# Patient Record
Sex: MALE | Employment: OTHER | URBAN - METROPOLITAN AREA
[De-identification: names, ages, dates, MRNs, and addresses within clinical notes are randomized per-mention and may not be internally consistent; named-entity substitution may affect disease eponyms.]

---

## 2017-04-13 RX ORDER — HYDROCORTISONE 20 MG/1
TABLET ORAL
Qty: 30 TAB | Refills: 5 | Status: SHIPPED | OUTPATIENT
Start: 2017-04-13 | End: 2017-05-31

## 2017-04-13 NOTE — TELEPHONE ENCOUNTER
Patient sister in reference to the following medication   Requested Prescriptions     Pending Prescriptions Disp Refills    hydrocortisone (CORTEF) 20 mg tablet [Pharmacy Med Name: HYDROCORT 20MG      TAB] 30 Tab 0     Sig: TAKE ONE TABLET BY MOUTH ONCE DAILY FOR  ANEMIA

## 2017-06-01 ENCOUNTER — PATIENT OUTREACH (OUTPATIENT)
Dept: FAMILY MEDICINE CLINIC | Age: 42
End: 2017-06-01

## 2017-06-01 NOTE — PROGRESS NOTES
Nurse Navigator  POST ED NOTE  Patient discharged on 05/31/17 from [facility] ED : seizures          Attempted to reach patient's sister Dale Black. Unable to reach. Left message on voicemail with contact info. No future PCP appt currently scheduled at this time    Will call again          This note will not be viewable in [address]

## 2017-06-02 ENCOUNTER — PATIENT OUTREACH (OUTPATIENT)
Dept: FAMILY MEDICINE CLINIC | Age: 42
End: 2017-06-02

## 2017-06-02 NOTE — PROGRESS NOTES
Nurse Navigator  POST ED NOTE  Patient discharged on 05/31/17 from [facility] ED : seizures              Attempted to reach patient's sister again Kenia Thompson. Unable to reach. Left message on voicemail with contact info.     No future PCP appt currently scheduled at this time     Letter sent            This note will not be viewable in [facility].

## 2017-08-29 ENCOUNTER — PATIENT OUTREACH (OUTPATIENT)
Dept: FAMILY MEDICINE CLINIC | Age: 42
End: 2017-08-29

## 2017-08-29 NOTE — PROGRESS NOTES
Received patient from [facility] daily report. Noted patient admitted 08/18/17 and discharged 08/18/17 to [address] [facility]. Facility researched and noted it is located in [facility]. Attempted to reach patient's sister. Phone number currently not accepting calls at this time. Patient last seen in office 11/16/2016. No future appt currently scheduled with Dr. Espinoza Morales at this time.         Letter sent

## 2017-08-29 NOTE — LETTER
8/29/2017 11:55 AM 
 
Mr. Samantha Narvaez [id number] 
[facility] [address] Dear Mr. Kayla Wilson, 
 
 
I am a Nurse Navigator working with your primary care provider (PCP), Dr. Ras Mark. Part of my job is to follow up with patients who have been in the hospital to see how they are feeling, answer any questions they may have about their visit, and also make sure they have a follow-up appointment to see their primary care doctor. I have been unable to reach you by telephone and wanted to make sure that you scheduled a follow-up appointment to come in and talk to Dr. Mora Andrade about your recent visit to the hospital.   
 
Please call our office at [phone] to schedule your appointment as soon as possible. If you have another provider managing your care at this time, please call the office so that we can update your chart. If you have any questions or concerns, please call [phone]. Thank you for allowing us to participate in your care! Sincerely, Gabrielle HAYESN, RN   Nurse Navigator [facility] [address] [address] Office   [phone] Fax   [phone]

## 2017-10-02 ENCOUNTER — PATIENT OUTREACH (OUTPATIENT)
Dept: FAMILY MEDICINE CLINIC | Age: 42
End: 2017-10-02

## 2017-10-02 NOTE — PROGRESS NOTES
This note is by Nurse Navigator that is supporting the office at this time. Patient listed on discharge daily report on 10/2/17  NN attempt to contact Patient on 10/2/17. From Daily report, Patient admitted on 9/29/17 and discharged on 09/29/17 to [address] [place] [address]. Attempt to contact patient and/o caregiver via telephone on 10/2/17 for post ED follow up. Unable to reach. Phone numbers are invalid. Patient last seen in Dr. Oliver Mcadams office on 11/16/2016. Noted , No future appt currently scheduled with Dr. Vita Carlson at this time.

## 2017-10-06 ENCOUNTER — DOCUMENTATION ONLY (OUTPATIENT)
Dept: FAMILY MEDICINE CLINIC | Age: 42
End: 2017-10-06

## 2017-10-06 NOTE — LETTER
10/6/2017 Tony Narvaez [id number] 
[facility] [address] Dear Mr. Tony Prasad, We had an appointment reserved for you 9/25/2017 and were concerned when you did not show or call within 24 hours to cancel the appointment. Our policy is to call patients two days prior to their appointment to remind them of the date and time. We perform these calls as a courtesy to our patients and to allow us the opportunity to rebook the time slot should the appointment not be necessary. Recognizing that everyones time is valuable and that appointment time is limited, we ask that you provide 24 hours notice if you are unable to keep your appointment. Please call us at your earliest convenience to reschedule your appointment as your provider felt it was important to see you. Thank you for your anticipated cooperation. The scheduling staff: 
 
[address] [place] [address],[address] [place] [id number] [phone]

## 2017-10-25 RX ORDER — PHENYTOIN 50 MG/1
TABLET, CHEWABLE ORAL
Qty: 6 TAB | Refills: 89 | OUTPATIENT
Start: 2017-10-25

## 2017-12-02 DIAGNOSIS — E03.8 OTHER SPECIFIED HYPOTHYROIDISM: ICD-10-CM

## 2017-12-11 RX ORDER — LEVOTHYROXINE SODIUM 50 UG/1
TABLET ORAL
Qty: 30 TAB | Refills: 11 | Status: SHIPPED | OUTPATIENT
Start: 2017-12-11 | End: 2017-12-19 | Stop reason: SDUPTHER

## 2017-12-19 ENCOUNTER — OFFICE VISIT (OUTPATIENT)
Dept: FAMILY MEDICINE CLINIC | Age: 42
End: 2017-12-19

## 2017-12-19 VITALS
DIASTOLIC BLOOD PRESSURE: 76 MMHG | WEIGHT: 97 LBS | RESPIRATION RATE: 16 BRPM | SYSTOLIC BLOOD PRESSURE: 92 MMHG | OXYGEN SATURATION: 76 % | BODY MASS INDEX: 13.14 KG/M2 | HEIGHT: 72 IN | TEMPERATURE: 97.2 F | HEART RATE: 49 BPM

## 2017-12-19 DIAGNOSIS — D64.9 ANEMIA, UNSPECIFIED TYPE: ICD-10-CM

## 2017-12-19 DIAGNOSIS — E03.8 OTHER SPECIFIED HYPOTHYROIDISM: ICD-10-CM

## 2017-12-19 DIAGNOSIS — Z00.00 MEDICARE ANNUAL WELLNESS VISIT, SUBSEQUENT: ICD-10-CM

## 2017-12-19 DIAGNOSIS — R56.9 SEIZURE (HCC): ICD-10-CM

## 2017-12-19 DIAGNOSIS — R64 CACHEXIA (HCC): ICD-10-CM

## 2017-12-19 DIAGNOSIS — Z13.220 SCREENING CHOLESTEROL LEVEL: ICD-10-CM

## 2017-12-19 DIAGNOSIS — Z78.9 FULL CODE STATUS: Primary | ICD-10-CM

## 2017-12-19 RX ORDER — PHENYTOIN 50 MG/1
TABLET, CHEWABLE ORAL
Qty: 90 TAB | Refills: 5 | Status: SHIPPED | OUTPATIENT
Start: 2017-12-19 | End: 2017-12-19 | Stop reason: SDUPTHER

## 2017-12-19 RX ORDER — LEVOTHYROXINE SODIUM 50 UG/1
TABLET ORAL
Qty: 90 TAB | Refills: 3 | Status: CANCELLED | OUTPATIENT
Start: 2017-12-19

## 2017-12-19 RX ORDER — LEVOTHYROXINE SODIUM 50 UG/1
TABLET ORAL
Qty: 90 TAB | Refills: 3 | Status: SHIPPED | OUTPATIENT
Start: 2017-12-19 | End: 2017-12-19 | Stop reason: SDUPTHER

## 2017-12-19 RX ORDER — HYDROCORTISONE 20 MG/1
20 TABLET ORAL DAILY
Qty: 30 TAB | Refills: 4 | Status: CANCELLED | OUTPATIENT
Start: 2017-12-19

## 2017-12-19 RX ORDER — HYDROCORTISONE 20 MG/1
20 TABLET ORAL DAILY
Qty: 30 TAB | Refills: 4 | Status: SHIPPED | OUTPATIENT
Start: 2017-12-19 | End: 2018-12-31 | Stop reason: SDUPTHER

## 2017-12-19 RX ORDER — PHENYTOIN 50 MG/1
TABLET, CHEWABLE ORAL
Qty: 90 TAB | Refills: 5 | Status: CANCELLED | OUTPATIENT
Start: 2017-12-19

## 2017-12-19 RX ORDER — LEVOTHYROXINE SODIUM 50 UG/1
TABLET ORAL
Qty: 90 TAB | Refills: 3 | Status: SHIPPED | OUTPATIENT
Start: 2017-12-19 | End: 2018-12-31 | Stop reason: SDUPTHER

## 2017-12-19 RX ORDER — PHENYTOIN 50 MG/1
TABLET, CHEWABLE ORAL
Qty: 90 TAB | Refills: 5 | Status: SHIPPED | OUTPATIENT
Start: 2017-12-19 | End: 2018-06-12 | Stop reason: SDUPTHER

## 2017-12-19 RX ORDER — HYDROCORTISONE 20 MG/1
20 TABLET ORAL DAILY
Qty: 30 TAB | Refills: 4 | Status: SHIPPED | OUTPATIENT
Start: 2017-12-19 | End: 2017-12-19 | Stop reason: SDUPTHER

## 2017-12-19 NOTE — PROGRESS NOTES
Subjective:     HPI:  Rosi Waterman is a 36 y.o. male who presents to the office with his sister for 1 year follow-up. Patient is non-verbal.  Sister is responsible for ADL's patient is capable of feeding himself. Home Health: Pt's main caregiver is his sister. Sister states she is unable to work and care for the patient. She is currently not working. She recently moved to [place], [place]. She has applied for home health 3 times in the past but has not been successful. Pt's sister is currently studying to get a GED and certification in Phlebotomy. Pt's sister requests home health referral to get assistance in her new place. She has recently moved to [place] she plans continue with me as Saurabh's PCP     Seizure: Pt takes Dilantin regularly for seizures. Pt has been to ED multiple times to get his medication refilled. Pt has not had any recent episodes of seizures. Of note,  Sister reports that patient's appetite has been good. He likes eating peanut butter and jelly, bananas, pork, meat loaf, and saleh. He doesn't like eating fruits and vegetables. He likes to drink milk, water, and certain juices. Bowel movement and urination are normal.     Wt Readings from Last 3 Encounters:   12/19/17 97 lb (44 kg)   06/28/17 97 lb (44 kg)   05/31/17 105 lb (47.6 kg)         Current Outpatient Prescriptions   Medication Sig Dispense Refill    levothyroxine (SYNTHROID) 50 mcg tablet TAKE ONE TABLET BY MOUTH ONCE DAILY BEFORE  BREAKFAST  FOR  HYPOTHYROIDISM 90 Tab 3    phenytoin (DILANTIN) 50 mg chewable tablet CHEW AND SWALLOW 1TAB BY MOUTH in the morning and 2 tab in the evening. 90 Tab 5    hydrocortisone (CORTEF) 20 mg tablet Take 1 Tab by mouth daily. Indications: anemia? 30 Tab 4    HYDROCORT/PRAMOXN/SKN CLNSR#16 (HYDROCORT-PRAMOX-SKIN CLNSR#16 RE) Insert 20 mg into rectum.           Allergies   Allergen Reactions    Aspirin Other (comments)    Kidney Romo Unknown (comments)    Sulfa (Sulfonamide Antibiotics) Other (comments)       Past Medical History:   Diagnosis Date    Autism     Blind     Diabetes ([address]     Other ill-defined conditions(799.89)     [facility]     Thyroid disorder         Past Surgical History:   Procedure Laterality Date    HX HEENT  as a baby. removal of right eye due to retinoblastoma. sees shadows with left eye       Family History   Problem Relation Age of Onset    Hypertension Mother     Heart Disease Mother     COPD Mother        Social History     Social History    Marital status: SINGLE     Spouse name: N/A    Number of children: N/A    Years of education: N/A     Occupational History    Not on file. Social History Main Topics    Smoking status: Never Smoker    Smokeless tobacco: Never Used    Alcohol use No    Drug use: No    Sexual activity: No     Other Topics Concern    Not on file     Social History Narrative       REVIEW OF SYSTEM:  Review of Systems   Constitutional: Negative for chills and fever. Respiratory: Negative for shortness of breath. Cardiovascular: Negative for chest pain, palpitations and leg swelling. Gastrointestinal: Negative for constipation, diarrhea, nausea and vomiting. Musculoskeletal: Negative for joint pain. Neurological: Negative for headaches. Objective:     Visit Vitals    BP 92/76 (BP 1 Location: Right arm, BP Patient Position: Sitting)    Pulse (!) 49    Temp 97.2 °F (36.2 °C) (Oral)    Resp 16    Ht 6' (1.829 m)    Wt 97 lb (44 kg)    SpO2 (!) 76%    BMI 13.16 kg/m2       PHYSICAL EXAM:  Physical Exam   Constitutional: He is oriented to person, place, and time and well-developed, well-nourished, and in no distress. HENT:   Right Ear: Tympanic membrane, external ear and ear canal normal.   Left Ear: Tympanic membrane, external ear and ear canal normal.   Nose: Nose normal.   Mouth/Throat: Oropharynx is clear and moist.   Eyes: Pupils are equal, round, and reactive to light.        Crusting noted to eyelashes of both eyes. Neck: Normal range of motion. Neck supple. No thyromegaly present. Cardiovascular: Normal rate, regular rhythm, normal heart sounds and intact distal pulses. No murmur heard. Pulmonary/Chest: Effort normal and breath sounds normal. He has no wheezes. Neurological: He is alert and oriented to person, place, and time. Skin: Skin is warm and dry. Vitals reviewed. Assessment/Plan:       ICD-10-CM ICD-9-CM    1. Full code status Z78.9 V49.89 FULL CODE   2. Other specified hypothyroidism E03.8 244.8 TSH 3RD GENERATION      levothyroxine (SYNTHROID) 50 mcg tablet      DISCONTINUED: levothyroxine (SYNTHROID) 50 mcg tablet   3. [facility]xia ([facility] R64 799.4 CBC WITH AUTOMATED DIFF      METABOLIC PANEL, COMPREHENSIVE      REFERRAL TO HOME HEALTH   4. Anemia, unspecified type D64.9 285.9 hydrocortisone (CORTEF) 20 mg tablet      DISCONTINUED: hydrocortisone (CORTEF) 20 mg tablet   5. Seizure ([facility] R56.9 780.39 phenytoin (DILANTIN) 50 mg chewable tablet      DISCONTINUED: phenytoin (DILANTIN) 50 mg chewable tablet   6. Screening cholesterol level Z13.220 V77.91 LIPID PANEL   7. Medicare annual wellness visit, subsequent Z00.00 V70.0      Patient given opportunity to ask questions. Questions answered. Home health referral completed today. Labs ordered in office today. Prescriptions re-sent to local pharmacy as patient will be in the area for a little while. Patient's sister understands plan of care. Follow-up Disposition: Not on File      Written by Addie Amaya, as dictated by Emma Domniguez DO.    I, Dr. Emma Dominguez, confirm that all documentation is accurate.

## 2017-12-19 NOTE — PROGRESS NOTES
1. Have you been to the ER, urgent care clinic since your last visit? Hospitalized since your last visit? NO    2. Have you seen or consulted any other health care providers outside of the [address] [place] [address] since your last visit? Include any pap smears or colon screening.  NO

## 2017-12-19 NOTE — PROGRESS NOTES
This is a Subsequent Medicare Annual Wellness Exam (AWV) (Performed 12 months after IPPE or effective date of Medicare Part B enrollment)    I have reviewed the patient's medical history in detail and updated the computerized patient record. History     Past Medical History:   Diagnosis Date    Autism     Blind     Diabetes ([facility]     Other ill-defined conditions(799.89)     Seizures ([facility]     Thyroid disorder       Past Surgical History:   Procedure Laterality Date    HX HEENT  as a baby. removal of right eye due to retinoblastoma. sees shadows with left eye     Current Outpatient Prescriptions   Medication Sig Dispense Refill    levothyroxine (SYNTHROID) 50 mcg tablet TAKE ONE TABLET BY MOUTH ONCE DAILY BEFORE  BREAKFAST  FOR  HYPOTHYROIDISM 30 Tab 11    phenytoin (DILANTIN) 50 mg chewable tablet CHEW AND SWALLOW 1TAB BY MOUTH in the morning and 2 tab in the evening. 90 Tab 5    HYDROCORT/PRAMOXN/SKN CLNSR#16 (HYDROCORT-PRAMOX-SKIN CLNSR#16 RE) Insert 20 mg into rectum. Allergies   Allergen Reactions    Aspirin Other (comments)    Kidney Romo Unknown (comments)    Sulfa (Sulfonamide Antibiotics) Other (comments)     Family History   Problem Relation Age of Onset    Hypertension Mother     Heart Disease Mother     COPD Mother      Social History   Substance Use Topics    Smoking status: Never Smoker    Smokeless tobacco: Never Used    Alcohol use No     Patient Active Problem List   Diagnosis Code    Autism F84.0    Cachexia ([facility] R64    Seizure ([facility] R56.9    Hypoglycemia E16.2    Hypotension I95.9    Blindness of both eyes H54.3    Hypothyroidism E03.9       Depression Risk Factor Screening:     PHQ over the last two weeks 12/19/2017   Little interest or pleasure in doing things Not at all   Feeling down, depressed or hopeless Not at all   Total Score PHQ 2 0     Alcohol Risk Factor Screening: You do not drink alcohol or very rarely.       Functional Ability and Level of Safety:   Hearing Loss  Unable to test     Vision - Patient is blind in both eyes. Activities of Daily Living  The home contains: no safety equipment. Patient needs help with:  transportation, preparing meals, housework, continence, grooming, dressing, bathing, hygiene, bathroom needs and walking. Fall RiskNo flowsheet data found. Abuse Screen  Patient is not abused    Cognitive Screening   Evaluation of Cognitive Function:  Has your family/caregiver stated any concerns about your memory: no  Abnormal    Patient Care Team   Patient Care Team:  Marcella Solorio DO as PCP - General (Family Practice)  Hallie Lopez as Ambulatory Care Navigator    Assessment/Plan   Education and counseling provided:  End of Life Counseling (with patient's permission) - Advanced directives and code status discussed with patient. Pt given advanced directive paperwork to review and complete at home. Diagnoses and all orders for this visit:    1. Full code status  -     FULL CODE    2. Other specified hypothyroidism  -     TSH 3RD GENERATION; Future  -     levothyroxine (SYNTHROID) 50 mcg tablet; TAKE ONE TABLET BY MOUTH ONCE DAILY BEFORE  BREAKFAST  FOR  HYPOTHYROIDISM    3. Cachexia ([facility]  -     CBC WITH AUTOMATED DIFF; Future  -     METABOLIC PANEL, COMPREHENSIVE; Future  -     REFERRAL TO HOME HEALTH    4. Anemia, unspecified type  -     hydrocortisone (CORTEF) 20 mg tablet; Take 1 Tab by mouth daily. Indications: anemia? 5. Seizure ([address]  -     phenytoin (DILANTIN) 50 mg chewable tablet; CHEW AND SWALLOW 1TAB BY MOUTH in the morning and 2 tab in the evening. 6. Screening cholesterol level  -     LIPID PANEL; Future    7. Medicare annual wellness visit, subsequent      Health Maintenance Due   Topic Date Due    DTaP/Tdap/Td series (1 - Tdap) 07/09/1995    Influenza Age 5 to Adult  08/01/2017     Patient given opportunity to ask questions. Questions answered. Patient understands plan of care.    Follow-up Disposition:  Return in about 6 months (around 6/19/2018).

## 2017-12-19 NOTE — PATIENT INSTRUCTIONS
Medicare Part B Preventive Services Limitations Recommendation Scheduled   Bone Mass Measurement  (age 72 & older, biennial) Requires diagnosis related to osteoporosis or estrogen deficiency. Biennial benefit unless patient has history of long-term glucocorticoid tx or baseline is needed because initial test was by other method     Cardiovascular Screening Blood Tests (every 5 years)  Total cholesterol, HDL, Triglycerides Order as a panel if possible     Colorectal Cancer Screening  -Fecal occult blood test (annual)  -Flexible sigmoidoscopy (5y)  -Screening colonoscopy (10y)  -Barium Enema      Counseling to Prevent Tobacco Use (up to 8 sessions per year)  - Counseling greater than 3 and up to 10 minutes  - Counseling greater than 10 minutes Patients must be asymptomatic of tobacco-related conditions to receive as preventive service     Diabetes Screening Tests (at least every 3 years, Medicare covers annually or at 6-month intervals for prediabetic patients)    Fasting blood sugar (FBS) or glucose tolerance test (GTT) Patient must be diagnosed with one of the following:  -Hypertension, Dyslipidemia, obesity, previous impaired FBS or GTT  Or any two of the following: overweight, FH of diabetes, age ? 72, history of gestational diabetes, birth of baby weighing more than 9 pounds     Diabetes Self-Management Training (DSMT) (no USPSTF recommendation) Requires referral by treating physician for patient with diabetes or renal disease. 10 hours of initial DSMT session of no less than 30 minutes each in a continuous 12-month period. 2 hours of follow-up DSMT in subsequent years.      Glaucoma Screening (no USPSTF recommendation) Diabetes mellitus, family history, , age 48 or over,  American, age 72 or over     Human Immunodeficiency Virus (HIV) Screening (annually for increased risk patients)  HIV-1 and HIV-2 by EIA, RANDAL, rapid antibody test, or oral mucosa transudate Patient must be at increased risk for HIV infection per USPSTF guidelines or pregnant. Tests covered annually for patients at increased risk. Pregnant patients may receive up to 3 test during pregnancy. Medical Nutrition Therapy (MNT) (for diabetes or renal disease not recommended schedule) Requires referral by treating physician for patient with diabetes or renal disease. Can be provided in same year as diabetes self-management training (DSMT), and CMS recommends medical nutrition therapy take place after DSMT. Up to 3 hours for initial year and 2 hours in subsequent years. Prostate Cancer Screening (annually up to age 76)  - Digital rectal exam (GET)  - Prostate specific antigen (PSA) Annually (age 48 or over), GET not paid separately when covered E/M service is provided on same date  Men up to age 76 may need a screening blood test for prostate cancer at certain intervals, depending on their personal and family history. This decision is between the patient and his provider. Seasonal Influenza Vaccination (annually)        Pneumococcal Vaccination (once after 72)      Hepatitis B Vaccinations (if medium/high risk) Medium/high risk factors:  End-stage renal disease,  Hemophiliacs who received Factor VIII or IX concentrates, Clients of institutions for the mentally retarded, Persons who live in the same house as a HepB virus carrier, Homosexual men, Illicit injectable drug abusers. Shingles Vaccination A shingles vaccine is also recommended once in a lifetime after age 61     Ultrasound Screening for Abdominal Aortic Aneurysm (AAA) (once) Patient must be referred through [facility] and not have had a screening for abdominal aortic aneurysm before under Medicare.   Limited to patients who meet one of the following criteria:  - Men who are 73-68 years old and have smoked more than 100 cigarettes in their lifetime.  -Anyone with a FH of AAA  -Anyone recommended for screening by USPSTF

## 2017-12-19 NOTE — MR AVS SNAPSHOT
Visit Information Date & Time Provider Department Dept. Phone Encounter #  
 12/19/2017  2:20 PM Shell King, [address] [facility] [id number] Upcoming Health Maintenance Date Due DTaP/Tdap/Td series (1 - Tdap) 7/9/1995 Influenza Age 5 to Adult 8/1/2017 Allergies as of 12/19/2017  Review Complete On: 12/19/2017 By: Elza Cesar LPN Severity Noted Reaction Type Reactions Aspirin  10/16/2015    Other (comments) Kidney Romo  06/28/2017    Unknown (comments) Sulfa (Sulfonamide Antibiotics)  10/16/2015    Other (comments) Current Immunizations  Never Reviewed Name Date Influenza Vaccine 1/30/2015 Pneumococcal Vaccine (Unspecified Type) 1/1/2012 Not reviewed this visit You Were Diagnosed With   
  
 Codes Comments Full code status    -  Primary ICD-10-CM: Z78.9 ICD-9-CM: V49.89 Other specified hypothyroidism     ICD-10-CM: E03.8 ICD-9-CM: 244.8 [facility]     ICD-10-CM: R64 
ICD-9-CM: 799.4 Anemia, unspecified type     ICD-10-CM: D64.9 ICD-9-CM: 285.9 Seizure ([facility]     ICD-10-CM: R56.9 ICD-9-CM: 780.39 Screening cholesterol level     ICD-10-CM: J79.311 ICD-9-CM: V77.91 Medicare annual wellness visit, subsequent     ICD-10-CM: Z00.00 ICD-9-CM: V70.0 Vitals BP Pulse Temp Resp Height(growth percentile) Weight(growth percentile) 92/76 (BP 1 Location: Right arm, BP Patient Position: Sitting) (!) 49 97.2 °F (36.2 °C) (Oral) 16 6' (1.829 m) 97 lb (44 kg) SpO2 BMI Smoking Status (!) 76% 13.16 kg/m2 Never Smoker Vitals History BMI and BSA Data Body Mass Index Body Surface Area  
 13.16 kg/m 2 1.5 m 2 Preferred Pharmacy Pharmacy Name Phone [facility] [address] [place] [address] [phone] Your Updated Medication List  
  
   
This list is accurate as of: 12/19/17  4:39 PM.  Always use your most recent med list.  
  
  
  
  
 HYDROCORT-PRAMOX-SKIN CLNSR#16 RE Insert 20 mg into rectum. hydrocortisone 20 mg tablet Commonly known as:  CORTEF Take 1 Tab by mouth daily. Indications: anemia?  
  
 levothyroxine 50 mcg tablet Commonly known as:  SYNTHROID  
TAKE ONE TABLET BY MOUTH ONCE DAILY BEFORE  BREAKFAST  FOR  HYPOTHYROIDISM phenytoin 50 mg chewable tablet Commonly known as:  DILANTIN  
CHEW AND SWALLOW 1TAB BY MOUTH in the morning and 2 tab in the evening. Prescriptions Sent to Pharmacy Refills  
 levothyroxine (SYNTHROID) 50 mcg tablet 3 Sig: TAKE ONE TABLET BY MOUTH ONCE DAILY BEFORE  BREAKFAST  FOR  HYPOTHYROIDISM Class: Normal  
 Pharmacy: [address] [facility] Ph #: [phone] phenytoin (DILANTIN) 50 mg chewable tablet 5 Sig: CHEW AND SWALLOW 1TAB BY MOUTH in the morning and 2 tab in the evening. Class: Normal  
 Pharmacy: [address] Ph #: [phone]  
 hydrocortisone (CORTEF) 20 mg tablet 4 Sig: Take 1 Tab by mouth daily. Indications: anemia? Class: Normal  
 Pharmacy: [address] [facility] Ph #: [phone] Route: Oral  
  
We Performed the Following FULL CODE [COD2 Custom] [address] Comments:  
 Please evaluate patient for home health needs. May need medical social worker if services needed. [place], [place]. To-Do List   
 12/19/2017 Lab:  CBC WITH AUTOMATED DIFF   
  
 12/19/2017 Lab:  LIPID PANEL   
  
 12/19/2017 Lab:  METABOLIC PANEL, COMPREHENSIVE   
  
 12/19/2017 Lab:  TSH 3RD GENERATION Referral Information Referral ID Referred By Referred To  
  
 [id number] Jose Rafael Renee [address] [address] [address] [facility] Phone: [phone] Visits Status Start Date End Date 1 New Request 12/19/17 12/19/18 If your referral has a status of pending review or denied, additional information will be sent to support the outcome of this decision. Patient Instructions Medicare Part B Preventive Services Limitations Recommendation Scheduled Bone Mass Measurement 
(age 72 & older, biennial) Requires diagnosis related to osteoporosis or estrogen deficiency. Biennial benefit unless patient has history of long-term glucocorticoid tx or baseline is needed because initial test was by other method Cardiovascular Screening Blood Tests (every 5 years) Total cholesterol, HDL, Triglycerides Order as a panel if possible Colorectal Cancer Screening 
-Fecal occult blood test (annual) -Flexible sigmoidoscopy (5y) 
-Screening colonoscopy (10y) -Barium Enema Counseling to Prevent Tobacco Use (up to 8 sessions per year) - Counseling greater than 3 and up to 10 minutes - Counseling greater than 10 minutes Patients must be asymptomatic of tobacco-related conditions to receive as preventive service Diabetes Screening Tests (at least every 3 years, Medicare covers annually or at 6-month intervals for prediabetic patients) Fasting blood sugar (FBS) or glucose tolerance test (GTT) Patient must be diagnosed with one of the following: 
-Hypertension, Dyslipidemia, obesity, previous impaired FBS or GTT 
Or any two of the following: overweight, FH of diabetes, age ? 72, history of gestational diabetes, birth of baby weighing more than 9 pounds Diabetes Self-Management Training (DSMT) (no USPSTF recommendation) Requires referral by treating physician for patient with diabetes or renal disease. 10 hours of initial DSMT session of no less than 30 minutes each in a continuous 12-month period. 2 hours of follow-up DSMT in subsequent years.     
Glaucoma Screening (no USPSTF recommendation) Diabetes mellitus, family history, , age 48 or over,  American, age 72 or over Human Immunodeficiency Virus (HIV) Screening (annually for increased risk patients) HIV-1 and HIV-2 by EIA, RANDAL, rapid antibody test, or oral mucosa transudate Patient must be at increased risk for HIV infection per USPSTF guidelines or pregnant. Tests covered annually for patients at increased risk. Pregnant patients may receive up to 3 test during pregnancy. Medical Nutrition Therapy (MNT) (for diabetes or renal disease not recommended schedule) Requires referral by treating physician for patient with diabetes or renal disease. Can be provided in same year as diabetes self-management training (DSMT), and CMS recommends medical nutrition therapy take place after DSMT. Up to 3 hours for initial year and 2 hours in subsequent years. Prostate Cancer Screening (annually up to age 76) - Digital rectal exam (GET) - Prostate specific antigen (PSA) Annually (age 48 or over), GET not paid separately when covered E/M service is provided on same date Men up to age 76 may need a screening blood test for prostate cancer at certain intervals, depending on their personal and family history. This decision is between the patient and his provider. Seasonal Influenza Vaccination (annually) Pneumococcal Vaccination (once after 65) Hepatitis B Vaccinations (if medium/high risk) Medium/high risk factors:  End-stage renal disease, Hemophiliacs who received Factor VIII or IX concentrates, Clients of institutions for the mentally retarded, Persons who live in the same house as a HepB virus carrier, Homosexual men, Illicit injectable drug abusers. Shingles Vaccination A shingles vaccine is also recommended once in a lifetime after age 61 Ultrasound Screening for Abdominal Aortic Aneurysm (AAA) (once) Patient must be referred through IPPE and not have had a screening for abdominal aortic aneurysm before under Medicare. Limited to patients who meet one of the following criteria: 
- Men who are 73-68 years old and have smoked more than 100 cigarettes in their lifetime. 
-Anyone with a FH of AAA 
-Anyone recommended for screening by [facility] Introducing [facility]! Lawrence Duenas introduces QSecure patient portal. Now you can access parts of your medical record, email your doctor's office, and request medication refills online. 1. In your internet browser, go to [email or web address] 2. Click on the First Time User? Click Here link in the Sign In box. You will see the New Member Sign Up page. 3. Enter your QSecure Access Code exactly as it appears below. You will not need to use this code after youve completed the sign-up process. If you do not sign up before the expiration date, you must request a new code. · QSecure Access Code: [email or web address] Expires: 3/19/2018  4:39 PM 
 
4. Enter the last four digits of your Social Security Number (xxxx) and Date of Birth (mm/dd/yyyy) as indicated and click Submit. You will be taken to the next sign-up page. 5. Create a QSecure ID. This will be your QSecure login ID and cannot be changed, so think of one that is secure and easy to remember. 6. Create a QSecure password. You can change your password at any time. 7. Enter your Password Reset Question and Answer. This can be used at a later time if you forget your password. 8. Enter your e-mail address. You will receive e-mail notification when new information is available in [address] 9. Click Sign Up. You can now view and download portions of your medical record. 10. Click the Download Summary menu link to download a portable copy of your medical information. If you have questions, please visit the Frequently Asked Questions section of the QSecure website. Remember, QSecure is NOT to be used for urgent needs. For medical emergencies, dial 911. Now available from your iPhone and Android! Please provide this summary of care documentation to your next provider. Your primary care clinician is listed as Teresa Wong. If you have any questions after today's visit, please call [phone].

## 2018-06-12 DIAGNOSIS — R56.9 SEIZURE (HCC): ICD-10-CM

## 2018-06-17 RX ORDER — PHENYTOIN 50 MG/1
TABLET, CHEWABLE ORAL
Qty: 90 TAB | Refills: 5 | Status: SHIPPED | OUTPATIENT
Start: 2018-06-17 | End: 2018-12-31 | Stop reason: SDUPTHER

## 2018-10-29 ENCOUNTER — TELEPHONE (OUTPATIENT)
Dept: FAMILY MEDICINE CLINIC | Age: 43
End: 2018-10-29
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
## 2018-11-28 ENCOUNTER — DOCUMENTATION ONLY (OUTPATIENT)
Dept: FAMILY MEDICINE CLINIC | Age: 43
End: 2018-11-28

## 2018-11-28 NOTE — LETTER
11/28/2018 Francisco Narvaez [address] 
[facility] [id number] Dear Mr. Francisco Jha, We had an appointment reserved for you on 11/27/18 and were concerned when you did not show or call within 24 hours to cancel the appointment. Our policy is to call patients two days prior to their appointment to remind them of the date and time. We perform these calls as a courtesy to our patients and to allow us the opportunity to rebook the time slot should the appointment not be necessary. Recognizing that everyones time is valuable and that appointment time is limited, we ask that you provide 24 hours notice if you are unable to keep your appointment. Please call us at your earliest convenience to reschedule your appointment as your provider felt it was important to see you. Thank you for your anticipated cooperation. [address] [place] [id number] 
[phone]

## 2018-12-26 DIAGNOSIS — E03.8 OTHER SPECIFIED HYPOTHYROIDISM: ICD-10-CM

## 2018-12-29 RX ORDER — LEVOTHYROXINE SODIUM 50 UG/1
TABLET ORAL
Qty: 90 TAB | Refills: 0 | Status: SHIPPED | OUTPATIENT
Start: 2018-12-29 | End: 2018-12-31 | Stop reason: SDUPTHER

## 2018-12-30 NOTE — TELEPHONE ENCOUNTER
No further refills will be given. Given 90 day supply. Needs office appointment within the next 90 days.

## 2018-12-31 ENCOUNTER — HOSPITAL ENCOUNTER (OUTPATIENT)
Dept: LAB | Age: 43
Discharge: HOME | End: 2018-12-31
Payer: MEDICARE

## 2018-12-31 ENCOUNTER — OFFICE VISIT (OUTPATIENT)
Dept: FAMILY MEDICINE CLINIC | Age: 43
End: 2018-12-31

## 2018-12-31 VITALS
WEIGHT: 98.6 LBS | TEMPERATURE: 97.6 F | HEART RATE: 88 BPM | HEIGHT: 72 IN | BODY MASS INDEX: 13.35 KG/M2 | SYSTOLIC BLOOD PRESSURE: 98 MMHG | RESPIRATION RATE: 16 BRPM | DIASTOLIC BLOOD PRESSURE: 68 MMHG

## 2018-12-31 DIAGNOSIS — E03.8 OTHER SPECIFIED HYPOTHYROIDISM: ICD-10-CM

## 2018-12-31 DIAGNOSIS — R64 CACHEXIA (HCC): ICD-10-CM

## 2018-12-31 DIAGNOSIS — Z00.00 MEDICARE ANNUAL WELLNESS VISIT, SUBSEQUENT: Primary | ICD-10-CM

## 2018-12-31 DIAGNOSIS — D64.9 ANEMIA, UNSPECIFIED TYPE: ICD-10-CM

## 2018-12-31 DIAGNOSIS — Z00.00 VISIT FOR WELL MAN HEALTH CHECK: ICD-10-CM

## 2018-12-31 DIAGNOSIS — R56.9 SEIZURE (HCC): ICD-10-CM

## 2018-12-31 LAB
ALBUMIN SERPL-MCNC: 3.4 G/DL (ref 3.4–5)
ALBUMIN/GLOB SERPL: 0.8 {RATIO} (ref 0.8–1.7)
ALP SERPL-CCNC: 129 U/L (ref 45–117)
ALT SERPL-CCNC: 19 U/L (ref 16–61)
ANION GAP SERPL CALC-SCNC: 8 MMOL/L (ref 3–18)
AST SERPL-CCNC: 25 U/L (ref 15–37)
BASOPHILS # BLD: 0 K/UL (ref 0–0.1)
BASOPHILS NFR BLD: 0 % (ref 0–2)
BILIRUB SERPL-MCNC: 0.2 MG/DL (ref 0.2–1)
BUN SERPL-MCNC: 12 MG/DL (ref 7–18)
BUN/CREAT SERPL: 16 (ref 12–20)
CALCIUM SERPL-MCNC: 8.1 MG/DL (ref 8.5–10.1)
CHLORIDE SERPL-SCNC: 105 MMOL/L (ref 100–108)
CO2 SERPL-SCNC: 25 MMOL/L (ref 21–32)
CREAT SERPL-MCNC: 0.77 MG/DL (ref 0.6–1.3)
DIFFERENTIAL METHOD BLD: ABNORMAL
EOSINOPHIL # BLD: 0.1 K/UL (ref 0–0.4)
EOSINOPHIL NFR BLD: 1 % (ref 0–5)
ERYTHROCYTE [DISTWIDTH] IN BLOOD BY AUTOMATED COUNT: 14.2 % (ref 11.6–14.5)
GLOBULIN SER CALC-MCNC: 4.2 G/DL (ref 2–4)
GLUCOSE SERPL-MCNC: 78 MG/DL (ref 74–99)
HCT VFR BLD AUTO: 34.1 % (ref 36–48)
HGB BLD-MCNC: 10.8 G/DL (ref 13–16)
LYMPHOCYTES # BLD: 0.9 K/UL (ref 0.9–3.6)
LYMPHOCYTES NFR BLD: 7 % (ref 21–52)
MCH RBC QN AUTO: 28.8 PG (ref 24–34)
MCHC RBC AUTO-ENTMCNC: 31.7 G/DL (ref 31–37)
MCV RBC AUTO: 90.9 FL (ref 74–97)
MONOCYTES # BLD: 0.9 K/UL (ref 0.05–1.2)
MONOCYTES NFR BLD: 8 % (ref 3–10)
NEUTS SEG # BLD: 9.6 K/UL (ref 1.8–8)
NEUTS SEG NFR BLD: 84 % (ref 40–73)
PHENYTOIN SERPL-MCNC: 28.7 UG/ML (ref 10–20)
PLATELET # BLD AUTO: 223 K/UL (ref 135–420)
PMV BLD AUTO: 12.7 FL (ref 9.2–11.8)
POTASSIUM SERPL-SCNC: 3.7 MMOL/L (ref 3.5–5.5)
PROT SERPL-MCNC: 7.6 G/DL (ref 6.4–8.2)
RBC # BLD AUTO: 3.75 M/UL (ref 4.7–5.5)
SODIUM SERPL-SCNC: 138 MMOL/L (ref 136–145)
TSH SERPL DL<=0.05 MIU/L-ACNC: 0.63 UIU/ML (ref 0.36–3.74)
WBC # BLD AUTO: 11.5 K/UL (ref 4.6–13.2)

## 2018-12-31 PROCEDURE — 84443 ASSAY THYROID STIM HORMONE: CPT

## 2018-12-31 PROCEDURE — 85025 COMPLETE CBC W/AUTO DIFF WBC: CPT

## 2018-12-31 PROCEDURE — 80185 ASSAY OF PHENYTOIN TOTAL: CPT

## 2018-12-31 PROCEDURE — 36415 COLL VENOUS BLD VENIPUNCTURE: CPT

## 2018-12-31 PROCEDURE — 80053 COMPREHEN METABOLIC PANEL: CPT

## 2018-12-31 RX ORDER — PHENYTOIN 50 MG/1
TABLET, CHEWABLE ORAL
Qty: 90 TAB | Refills: 11 | Status: SHIPPED | OUTPATIENT
Start: 2018-12-31 | End: 2020-01-03 | Stop reason: SDUPTHER

## 2018-12-31 RX ORDER — HYDROCORTISONE 20 MG/1
20 TABLET ORAL DAILY
Qty: 30 TAB | Refills: 11 | Status: SHIPPED | OUTPATIENT
Start: 2018-12-31 | End: 2020-01-08 | Stop reason: SDUPTHER

## 2018-12-31 RX ORDER — LEVOTHYROXINE SODIUM 50 UG/1
TABLET ORAL
Qty: 90 TAB | Refills: 3 | Status: SHIPPED | OUTPATIENT
Start: 2018-12-31 | End: 2020-01-08 | Stop reason: SDUPTHER

## 2018-12-31 NOTE — PROGRESS NOTES
Subjective:     HPI:  Adeline Bentley is a 39 y.o. male who presents to the office today for routine care. Patient is non-verbal.  Sister is responsible for ADL's patient is capable of feeding himself. Last office visit. 6/2017     Seizure: Pt takes Dilantin regularly for seizures. Pt's last seizure was about 7 months ago. Communication: Pt's sister notes that he is able to communicate when he wants food, drink, or to go to the bathroom. She states that he does not regularly have incidences of incontinence most episodes occur during the night, but even those are infrequent. Pt's sister states that pt's toes curl which makes it hard for his shoes to fit. He usually will not wear shoes. Of note, pt's sister reports that pt has a bowel movement once every 2 days. Straining sometimes but not always. Does not like to eat vegetables. But reports if she tries prune juice it gives him multiple episodes of encontinence. Pt's sister also notes that pt likes peanut butter and jelly sandwiches and [place] food. She states that he does not like to eat vegetables, but will eat bananas and applesauce. Current Outpatient Medications   Medication Sig Dispense Refill    hydrocortisone (CORTEF) 20 mg tablet Take 1 Tab by mouth daily. 30 Tab 11    levothyroxine (SYNTHROID) 50 mcg tablet TAKE ONE TABLET BY MOUTH ONCE DAILY BEFORE  BREAKFAST  FOR  HYPOTHYROIDISM 90 Tab 3    phenytoin (DILANTIN) 50 mg chewable tablet CHEW AND SWALLOW 1TAB BY MOUTH in the morning and 2 tab in the evening. 90 Tab 11        Allergies   Allergen Reactions    Aspirin Other (comments)    Kidney Romo Unknown (comments)    Sulfa (Sulfonamide Antibiotics) Other (comments)       Past Medical History:   Diagnosis Date    Autism     Blind     Diabetes ([address]     Other ill-defined conditions(789.89)     Seizures ([address]     Thyroid disorder         Past Surgical History:   Procedure Laterality Date    HX HEENT  as a baby. removal of right eye due to retinoblastoma. sees shadows with left eye       Family History   Problem Relation Age of Onset    Hypertension Mother     Heart Disease Mother     COPD Mother        Social History     Socioeconomic History    Marital status: SINGLE     Spouse name: Not on file    Number of children: Not on file    Years of education: Not on file    Highest education level: Not on file   Social Needs    Financial resource strain: Not on file    Food insecurity - worry: Not on file    Food insecurity - inability: Not on file   [employer] needs - medical: Not on file   [employer] needs - non-medical: Not on file   Occupational History    Not on file   Tobacco Use    Smoking status: Never Smoker    Smokeless tobacco: Never Used   Substance and Sexual Activity    Alcohol use: No    Drug use: No    Sexual activity: No   Other Topics Concern    Not on file   Social History Narrative    Not on file       REVIEW OF SYSTEM:  Review of Systems   Constitutional: Negative for chills and fever. Eyes: Negative for blurred vision. Respiratory: Negative for shortness of breath. Cardiovascular: Negative for chest pain, palpitations and leg swelling. Gastrointestinal: Negative for constipation, diarrhea, nausea and vomiting. Musculoskeletal: Negative for joint pain. Neurological: Negative for headaches. Objective:     Visit Vitals  BP 98/68 (BP 1 Location: Right arm, BP Patient Position: Sitting)   Pulse 88   Temp 97.6 °F (36.4 °C) (Oral)   Resp 16   Ht 6' (1.829 m)   Wt 98 lb 9.6 oz (44.7 kg)   BMI 13.37 kg/m²       PHYSICAL EXAM:  Physical Exam   Constitutional: Vital signs are normal. He appears cachectic. Cooperative during physical exam.   Sitting up in wheel chair. HENT:   Head: Normocephalic and atraumatic.    Right Ear: Tympanic membrane, external ear and ear canal normal.   Left Ear: Tympanic membrane, external ear and ear canal normal.   Nose: Mucosal edema (right side) present. Mouth/Throat: Oropharynx is clear and moist and mucous membranes are normal. Normal dentition. No dental caries. Eyes: Left eye exhibits normal extraocular motion. Left pupil is not reactive. Neck: Normal range of motion. Neck supple. No thyromegaly present. Cardiovascular: Normal rate, regular rhythm, normal heart sounds and intact distal pulses. No murmur heard. Pulmonary/Chest: Effort normal and breath sounds normal. He has no wheezes. Abdominal: Bowel sounds are normal. There is no tenderness. Musculoskeletal:        Right knee: He exhibits deformity (contracture). Left foot: There is deformity (toes contracted in flexed position. ). Feet:    Neurological: He is alert. Skin: Skin is warm and dry. No bruising and no lesion noted. No erythema. Vitals reviewed. Assessment/Plan:       ICD-10-CM ICD-9-CM    1. Visit for well man health check H21.04 Z47.9 METABOLIC PANEL, COMPREHENSIVE   2. Anemia, unspecified type D64.9 285.9 hydrocortisone (CORTEF) 20 mg tablet      METABOLIC PANEL, COMPREHENSIVE      CBC WITH AUTOMATED DIFF   3. Other specified hypothyroidism E03.8 244.8 levothyroxine (SYNTHROID) 50 mcg tablet      TSH 3RD GENERATION   4. Seizure ([facility]) R56.9 780.39 phenytoin (DILANTIN) 50 mg chewable tablet      PHENYTOIN     Patient given opportunity to ask questions. Questions answered. Patient understands plan of care. Sister declines flu shot today. Consider physical therapy in the future if patient will tolerate. Consider ortho for release of contractures. Follow-up Disposition:  Return in about 6 months (around 6/30/2019). Written by Eneida Sadler, as dictated by Alexa Ventura DO.    I, Dr. Alexa Ventura, confirm that all documentation is accurate.

## 2018-12-31 NOTE — PROGRESS NOTES
This is the Subsequent Medicare Annual Wellness Exam, performed 12 months or more after the Initial AWV or the last Subsequent AWV    I have reviewed the patient's medical history in detail and updated the computerized patient record. History     Past Medical History:   Diagnosis Date    Autism     Blind     Diabetes ([facility]     Other ill-defined conditions(799.89)     Seizures ([facility]     Thyroid disorder       Past Surgical History:   Procedure Laterality Date    HX HEENT  as a baby. removal of right eye due to retinoblastoma. sees shadows with left eye     Current Outpatient Medications   Medication Sig Dispense Refill    hydrocortisone (CORTEF) 20 mg tablet Take 1 Tab by mouth daily. 30 Tab 11    levothyroxine (SYNTHROID) 50 mcg tablet TAKE ONE TABLET BY MOUTH ONCE DAILY BEFORE  BREAKFAST  FOR  HYPOTHYROIDISM 90 Tab 3    phenytoin (DILANTIN) 50 mg chewable tablet CHEW AND SWALLOW 1TAB BY MOUTH in the morning and 2 tab in the evening. 90 Tab 11     Allergies   Allergen Reactions    Aspirin Other (comments)    Kidney Romo Unknown (comments)    Sulfa (Sulfonamide Antibiotics) Other (comments)     Family History   Problem Relation Age of Onset    Hypertension Mother     Heart Disease Mother     COPD Mother      Social History     Tobacco Use    Smoking status: Never Smoker    Smokeless tobacco: Never Used   Substance Use Topics    Alcohol use: No     Patient Active Problem List   Diagnosis Code    Autism F84.0    Cachexia ([facility] R64    Seizure ([facility] R56.9    Hypoglycemia E16.2    Hypotension I95.9    Blindness of both eyes H54.3    Hypothyroidism E03.9       Depression Risk Factor Screening:     PHQ over the last two weeks 12/19/2017   Little interest or pleasure in doing things Not at all   Feeling down, depressed, irritable, or hopeless Not at all   Total Score PHQ 2 0     Alcohol Risk Factor Screening: You do not drink alcohol or very rarely.     Functional Ability and Level of Safety:   Hearing Loss  The patient needs further evaluation. Activities of Daily Living  The home contains: no safety equipment. Patient needs help with:  transportation, preparing meals, managing medications, dressing, bathing, hygiene, bathroom needs and walking  Patient is independent with eating only. Fall Risk  No flowsheet data found. Abuse Screen  Patient is not abused  Pt is under total care of Sister and Nephew    Cognitive Screening   Evaluation of Cognitive Function:  Has your family/caregiver stated any concerns about your memory: not able to test.     Patient Care Team   Patient Care Team:  Mechelle Blanchard DO as PCP - General (Family Practice)    Assessment/Plan   Education and counseling provided:  Are appropriate based on today's review and evaluation    Diagnoses and all orders for this visit:    1. Visit for well man health check  -     METABOLIC PANEL, COMPREHENSIVE; Future    2. Anemia, unspecified type  -     hydrocortisone (CORTEF) 20 mg tablet; Take 1 Tab by mouth daily.  -     METABOLIC PANEL, COMPREHENSIVE; Future  -     CBC WITH AUTOMATED DIFF; Future    3. Other specified hypothyroidism  -     levothyroxine (SYNTHROID) 50 mcg tablet; TAKE ONE TABLET BY MOUTH ONCE DAILY BEFORE  BREAKFAST  FOR  HYPOTHYROIDISM  -     TSH 3RD GENERATION; Future    4.  Seizure ([address]  -     phenytoin (DILANTIN) 50 mg chewable tablet; CHEW AND SWALLOW 1TAB BY MOUTH in the morning and 2 tab in the evening.  -     PHENYTOIN; Future        Health Maintenance Due   Topic Date Due    DTaP/Tdap/Td series (1 - Tdap) 07/09/1995    MEDICARE YEARLY EXAM  12/20/2018

## 2018-12-31 NOTE — PROGRESS NOTES
1. Have you been to the ER, urgent care clinic since your last visit? Hospitalized since your last visit? No    2. Have you seen or consulted any other health care providers outside of the [address] [place] [address] since your last visit? Include any pap smears or colon screening. No     Patient presents in office today for routine care.   Patient concerns: med refills

## 2019-01-18 DIAGNOSIS — R56.9 SEIZURE (HCC): ICD-10-CM

## 2019-01-23 RX ORDER — PHENYTOIN 50 MG/1
TABLET, CHEWABLE ORAL
Qty: 90 TAB | Refills: 5 | Status: SHIPPED | OUTPATIENT
Start: 2019-01-23 | End: 2019-07-22 | Stop reason: ALTCHOICE

## 2019-07-22 ENCOUNTER — OFFICE VISIT (OUTPATIENT)
Dept: FAMILY MEDICINE CLINIC | Age: 44
End: 2019-07-22

## 2019-07-22 VITALS
OXYGEN SATURATION: 98 % | DIASTOLIC BLOOD PRESSURE: 80 MMHG | RESPIRATION RATE: 20 BRPM | WEIGHT: 90 LBS | TEMPERATURE: 98 F | BODY MASS INDEX: 12.19 KG/M2 | HEART RATE: 60 BPM | HEIGHT: 72 IN | SYSTOLIC BLOOD PRESSURE: 110 MMHG

## 2019-07-22 DIAGNOSIS — D64.9 ANEMIA, UNSPECIFIED TYPE: ICD-10-CM

## 2019-07-22 DIAGNOSIS — E03.8 OTHER SPECIFIED HYPOTHYROIDISM: ICD-10-CM

## 2019-07-22 DIAGNOSIS — Z74.09 MOBILITY IMPAIRED: ICD-10-CM

## 2019-07-22 DIAGNOSIS — R56.9 SEIZURE (HCC): ICD-10-CM

## 2019-07-22 DIAGNOSIS — G47.9 DISORDERED SLEEP: ICD-10-CM

## 2019-07-22 DIAGNOSIS — Z00.00 ANNUAL PHYSICAL EXAM: Primary | ICD-10-CM

## 2019-07-22 DIAGNOSIS — E83.51 HYPOCALCEMIA: ICD-10-CM

## 2019-07-22 DIAGNOSIS — R26.2 AMBULATORY DYSFUNCTION: ICD-10-CM

## 2019-07-22 RX ORDER — UREA 10 %
1 LOTION (ML) TOPICAL
Qty: 90 TAB | Refills: 1 | Status: SHIPPED | OUTPATIENT
Start: 2019-07-22 | End: 2020-01-08 | Stop reason: SDUPTHER

## 2019-07-22 NOTE — PATIENT INSTRUCTIONS
Phenytoin (By mouth)   Phenytoin (FEN-i-toin)  Treats and prevents seizures. Brand Name(s): Dilantin, Dilantin Infatabs, Dilantin Kapseals, Dilantin-125, Phenytek   There may be other brand names for this medicine. When This Medicine Should Not Be Used: This medicine is not right for everyone. Do not use it if you had an allergic reaction to phenytoin or similar medicines, or if you are pregnant. How to Use This Medicine:   Capsule, Long Acting Capsule, Liquid, Chewable Tablet  · Your doctor will tell you how much medicine to use. Do not use more than directed. · You may take this medicine with food if it upsets your stomach. Take this medicine at the same time each day. · Capsule: Swallow whole. Do not open, crush, or chew it. · Chewable tablet: May be chewed, swallowed whole, or crushed before you swallow it. · Oral liquid: Shake just before each use. Measure the oral liquid medicine with a marked measuring spoon, oral syringe, or medicine cup. · Feeding tube: This medicine should be given at least 2 hours before or 2 hours after a feeding. · This medicine should come with a Medication Guide. Ask your pharmacist for a copy if you do not have one. · Missed dose: Take a dose as soon as you remember. If it is almost time for your next dose, wait until then and take a regular dose. Do not take extra medicine to make up for a missed dose. · Store the medicine in a closed container at room temperature, away from heat, moisture, and direct light. Do not freeze the oral liquid. Drugs and Foods to Avoid:   Ask your doctor or pharmacist before using any other medicine, including over-the-counter medicines, vitamins, and herbal products. · Do not use this medicine together with delavirdine. · The list below includes some of the medicines that can interact with phenytoin. There are many other drugs not listed. Make sure your doctor knows the names of all the medicines you use.   ¨ Tell your doctor if you are using Sukumar's wort, albendazole, amiodarone, aspirin, chlordiazepoxide, cyclosporine, diazepam, diazoxide, digoxin, disulfiram, folic acid, furosemide, isoniazid, methylphenidate, nisoldipine, praziquantel, quinidine, reserpine, rifampin, sucralfate, theophylline, tolbutamide, or vitamin D.  ¨ Tell your doctor if you are using cancer medicine, birth control pills, medicine to treat an infection (including a sulfa drug, medicine to treat HIV/AIDS, or medicine for a fungus infection), a steroid medicine, medicine to lower cholesterol, medicine to treat depression, a phenothiazine medicine, a stomach medicine, or a blood thinner (such as ticlopidine, warfarin). · Do not take an antacid or supplement that contains calcium, aluminum, or magnesium at the same time you take phenytoin. Take the antacid or supplement at a different time of day. · Do not drink alcohol while you are using this medicine. Warnings While Using This Medicine:   · It is not safe to take this medicine during pregnancy. It could harm an unborn baby. Tell your doctor right away if you become pregnant. · Tell your doctor if you are breastfeeding, or if you have kidney disease, liver disease, depression, diabetes, or porphyria. · This medicine may cause the following problems:  ¨ An increased risk of suicidal thoughts  ¨ Serious skin reactions (may happen after treatment has stopped)  ¨ Drug reaction with eosinophilia and systemic symptoms (DRESS), which may damage organs such as the liver, kidney, or heart  ¨ Liver damage  ¨ Decreased levels of blood cells, which may cause bleeding problems or increase your risk for infection  ¨ Weak bones  ¨ Higher blood sugar levels  · Do not stop using this medicine suddenly. Your doctor will need to slowly decrease your dose before you stop it completely. · This medicine may make you drowsy. Do not drive or do anything that could be dangerous until you know how this medicine affects you.   · This medicine may damage your gums. Brush and floss your teeth regularly and visit your dentist to help prevent these problems. · Tell any doctor or dentist who treats you that you are using this medicine. This medicine may affect certain medical test results. · Your doctor will do lab tests at regular visits to check on the effects of this medicine. Keep all appointments. · Keep all medicine out of the reach of children. Never share your medicine with anyone. Possible Side Effects While Using This Medicine:   Call your doctor right away if you notice any of these side effects:  · Allergic reaction: Itching or hives, swelling in your face or hands, swelling or tingling in your mouth or throat, chest tightness, trouble breathing  · Blistering, peeling, or red skin rash  · Dark urine or pale stools, nausea, vomiting, loss of appetite, stomach pain, yellow skin or eyes  · Feeling depressed, irritable, or restless  · Fever, chills, cough, sore throat, and body aches  · Fever, skin rash, or swollen glands in your armpits, neck, or groin  · Severe confusion, problems with balance or walking, slurred speech, tremors  · Thoughts of hurting yourself, other unusual thoughts or behaviors  · Unusual bleeding, bruising, or weakness  If you notice these less serious side effects, talk with your doctor:   · Constipation, nausea, vomiting  · Dizziness or headache  · Feeling of constant movement of self or surroundings  · Mild confusion, slurred speech, clumsiness, problems with balance  If you notice other side effects that you think are caused by this medicine, tell your doctor. Call your doctor for medical advice about side effects. You may report side effects to FDA at 0-862-FDA-6696  © 2017 [facility] Information is for End User's use only and may not be sold, redistributed or otherwise used for commercial purposes. The above information is an  only.  It is not intended as medical advice for individual conditions or treatments. Talk to your doctor, nurse or pharmacist before following any medical regimen to see if it is safe and effective for you.

## 2019-07-22 NOTE — PROGRESS NOTES
1. Have you been to the ER, urgent care clinic since your last visit? Hospitalized since your last visit? No    2. Have you seen or consulted any other health care providers outside of the [address] [place] [address] since your last visit? Include any pap smears or colon screening.  No

## 2019-07-22 NOTE — PROGRESS NOTES
Subjective     Patient ID:  Paty Covington is a 38 y.o. ( 1975) male who presents for the following:   Establish Care      HPI  Previous PCP was Dr. Kin Singleton. Presents to appointment with his sister Nidhi Cheung. He has a history of seizure disorder, hypothyroidism, diabetes?, blindness, autism, cachexia, and anemia. Mobility: nonambulatory, needs assistance with transfers. Patient feeds himself, dresses, can \"scoot\" on the floor into the bathroom and toilet independently. Everything else must be done for him. Cared for by his sister and his nephews. Seizure disorder:  Has not been to a neurologist locally. Last seizure was last year. Taking dilantin. Sometimes sleeps during the night, sometimes up all night and sleeps during the day. Reports he has been eating fairly well, urinating, and defecating normally. Review of Systems   Constitutional: Negative for appetite change, diaphoresis, fatigue, fever and unexpected weight change. HENT: Negative for congestion, sore throat and trouble swallowing. Eyes: Positive for visual disturbance. Respiratory: Negative for cough and shortness of breath. Cardiovascular: Negative for chest pain, palpitations and leg swelling. Gastrointestinal: Negative for abdominal distention, abdominal pain, anal bleeding, blood in stool, constipation, diarrhea, nausea, rectal pain and vomiting. Endocrine: Negative for polydipsia, polyphagia and polyuria. Genitourinary: Negative for difficulty urinating, dysuria, frequency, hematuria and urgency. Musculoskeletal: Positive for gait problem. Negative for arthralgias and joint swelling. Skin: Negative for rash. Neurological: Negative for dizziness, syncope, weakness and headaches. Psychiatric/Behavioral: Positive for sleep disturbance. Negative for dysphoric mood. The patient is not nervous/anxious.          Past Medical History, Past Surgery History, Allergies, Social History, and Family History were reviewed and updated. Past Medical History:   Diagnosis Date    Autism     Blind     Diabetes ([facility]     Other ill-defined conditions(799.89)     Seizures ([facility]     Thyroid disorder      Past Surgical History:   Procedure Laterality Date    HX HEENT  as a baby. removal of right eye due to retinoblastoma.  sees shadows with left eye     Family History   Problem Relation Age of Onset    Hypertension Mother     Heart Disease Mother     COPD Mother      Social History     Socioeconomic History    Marital status: SINGLE     Spouse name: Not on file    Number of children: Not on file    Years of education: Not on file    Highest education level: Not on file   Occupational History    Not on file   Social Needs    Financial resource strain: Not on file    Food insecurity:     Worry: Not on file     Inability: Not on file    Transportation needs:     Medical: Not on file     Non-medical: Not on file   Tobacco Use    Smoking status: Never Smoker    Smokeless tobacco: Never Used   Substance and Sexual Activity    Alcohol use: No    Drug use: No    Sexual activity: Never   Lifestyle    Physical activity:     Days per week: Not on file     Minutes per session: Not on file    Stress: Not on file   Relationships    Social connections:     Talks on phone: Not on file     Gets together: Not on file     Attends [employer] service: Not on file     Active member of club or organization: Not on file     Attends meetings of clubs or organizations: Not on file     Relationship status: Not on file    Intimate partner violence:     Fear of current or ex partner: Not on file     Emotionally abused: Not on file     Physically abused: Not on file     Forced sexual activity: Not on file   Other Topics Concern    Not on file   Social History Narrative    Not on file     Allergies   Allergen Reactions    Aspirin Other (comments)    Kidney Romo Unknown (comments)    Sulfa (Sulfonamide Antibiotics) Other (comments)     Current Outpatient Medications on File Prior to Visit   Medication Sig Dispense Refill    hydrocortisone (CORTEF) 20 mg tablet Take 1 Tab by mouth daily. 30 Tab 11    levothyroxine (SYNTHROID) 50 mcg tablet TAKE ONE TABLET BY MOUTH ONCE DAILY BEFORE  BREAKFAST  FOR  HYPOTHYROIDISM 90 Tab 3    phenytoin (DILANTIN) 50 mg chewable tablet CHEW AND SWALLOW 1TAB BY MOUTH in the morning and 2 tab in the evening. 90 Tab 11     No current facility-administered medications on file prior to visit. Objective     Visit Vitals  /80   Pulse 60   Temp 98 °F (36.7 °C) (Oral)   Resp 20   Ht 6' (1.829 m)   Wt 90 lb (40.8 kg)   SpO2 98%   BMI 12.21 kg/m²     No LMP for male patient. Physical Exam   Constitutional: He is oriented to person, place, and time. He appears well-developed and well-nourished. No distress. Frail appearing man in a wheelchair. HENT:   Head: Normocephalic and atraumatic. Right Ear: Hearing, tympanic membrane, external ear and ear canal normal.   Left Ear: Hearing, tympanic membrane, external ear and ear canal normal.   Nose: Nose normal.   Mouth/Throat: Oropharynx is clear and moist and mucous membranes are normal.   Eyes: Conjunctivae are normal.   Neck: Neck supple. Carotid bruit is not present. No thyroid mass and no thyromegaly present. Cardiovascular: Normal rate, regular rhythm, normal heart sounds and intact distal pulses. Exam reveals no gallop and no friction rub. No murmur heard. Pulmonary/Chest: Effort normal and breath sounds normal. No respiratory distress. Abdominal: Soft. Normal appearance and bowel sounds are normal. He exhibits no distension, no pulsatile midline mass and no mass. There is no hepatosplenomegaly. There is no tenderness. There is no CVA tenderness. Musculoskeletal: Normal range of motion. He exhibits no edema, tenderness or deformity. Lymphadenopathy:     He has no cervical adenopathy.    Neurological: He is alert and oriented to person, place, and time. He has normal strength and normal reflexes. No cranial nerve deficit or sensory deficit. Gait normal.   Skin: Skin is warm and dry. No rash noted. He is not diaphoretic. Psychiatric:   Nonverbal. Cooperative. LABS     TESTS      Assessment and Plan     1. Annual physical exam      2. Hypocalcemia    - METABOLIC PANEL, COMPREHENSIVE; Future  - VITAMIN D, 25 HYDROXY; Future  - PTH INTACT; Future    3. Seizure ([address]  Continue current medication. Establish with local neurologist.  - [address]; Future  - PHENYTOIN; Future    4. Anemia, unspecified type    - CBC WITH AUTOMATED DIFF; Future  - IRON PROFILE; Future    5. Other specified hypothyroidism  Continue current medication. Dose adjustment if indicated based on lab results.  - TSH 3RD GENERATION; Future    6. Ambulatory dysfunction  Home health physical therapy, Occupational Therapy, and aide.  - REFERRAL TO HOME HEALTH    7. Mobility impaired    - REFERRAL TO HOME HEALTH    8. Disordered sleep  Trial of low-dose melatonin in the afternoon to regulate sleep cycle. - melatonin 1 mg tablet; Take 1 Tab by mouth daily (after lunch). Dispense: 90 Tab; Refill: 1    Follow-up in about 3 months. Risks, benefits, and alternatives of the medications and treatment plan prescribed today were discussed, and patient expressed understanding. Printed after visit summary was given to patient and reviewed. All patient questions and concerns were addressed. Plan follow-up as discussed or as needed if any worsening symptoms or change in condition.            Signed electronically by Nadege Casillas DNP, FNP-BC

## 2020-01-03 DIAGNOSIS — R56.9 SEIZURE (HCC): ICD-10-CM

## 2020-01-03 NOTE — TELEPHONE ENCOUNTER
Requested Prescriptions     Pending Prescriptions Disp Refills    phenytoin (DILANTIN) 50 mg chewable tablet 90 Tab 11     Sig: CHEW AND SWALLOW 1TAB BY MOUTH in the morning and 2 tab in the evening. Last appt. 12/7/19  Next appt.  Unknown

## 2020-01-06 RX ORDER — PHENYTOIN 50 MG/1
TABLET, CHEWABLE ORAL
Qty: 90 TAB | Refills: 11 | Status: SHIPPED | OUTPATIENT
Start: 2020-01-06 | End: 2020-01-08 | Stop reason: SDUPTHER

## 2020-01-08 DIAGNOSIS — E03.8 OTHER SPECIFIED HYPOTHYROIDISM: ICD-10-CM

## 2020-01-08 DIAGNOSIS — R56.9 SEIZURE (HCC): ICD-10-CM

## 2020-01-08 DIAGNOSIS — D64.9 ANEMIA, UNSPECIFIED TYPE: ICD-10-CM

## 2020-01-08 DIAGNOSIS — G47.9 DISORDERED SLEEP: ICD-10-CM

## 2020-01-08 RX ORDER — UREA 10 %
1 LOTION (ML) TOPICAL
Qty: 90 TAB | Refills: 1 | Status: SHIPPED | OUTPATIENT
Start: 2020-01-08

## 2020-01-08 RX ORDER — PHENYTOIN 50 MG/1
TABLET, CHEWABLE ORAL
Qty: 90 TAB | Refills: 11 | Status: SHIPPED | OUTPATIENT
Start: 2020-01-08 | End: 2020-01-09 | Stop reason: SDUPTHER

## 2020-01-08 RX ORDER — HYDROCORTISONE 20 MG/1
20 TABLET ORAL DAILY
Qty: 30 TAB | Refills: 11 | Status: SHIPPED | OUTPATIENT
Start: 2020-01-08 | End: 2020-03-12 | Stop reason: SDUPTHER

## 2020-01-08 RX ORDER — LEVOTHYROXINE SODIUM 50 UG/1
TABLET ORAL
Qty: 90 TAB | Refills: 3 | Status: SHIPPED | OUTPATIENT
Start: 2020-01-08 | End: 2020-01-09 | Stop reason: SDUPTHER

## 2020-01-08 NOTE — TELEPHONE ENCOUNTER
Requested Prescriptions     Pending Prescriptions Disp Refills    hydrocortisone (CORTEF) 20 mg tablet 30 Tab 11     Sig: Take 1 Tab by mouth daily. Indications: anemia?  phenytoin (DILANTIN) 50 mg chewable tablet 90 Tab 11     Sig: CHEW AND SWALLOW 1TAB BY MOUTH in the morning and 2 tab in the evening.  levothyroxine (SYNTHROID) 50 mcg tablet 90 Tab 3     Sig: TAKE ONE TABLET BY MOUTH ONCE DAILY BEFORE  BREAKFAST  FOR  HYPOTHYROIDISM    melatonin 1 mg tablet 90 Tab 1     Sig: Take 1 Tab by mouth daily (after lunch).      Pt has scheudled appt 1/13/20

## 2020-01-09 ENCOUNTER — TELEPHONE (OUTPATIENT)
Dept: FAMILY MEDICINE CLINIC | Age: 45
End: 2020-01-09

## 2020-01-09 DIAGNOSIS — R56.9 SEIZURE (HCC): ICD-10-CM

## 2020-01-09 DIAGNOSIS — E03.8 OTHER SPECIFIED HYPOTHYROIDISM: ICD-10-CM

## 2020-01-09 NOTE — TELEPHONE ENCOUNTER
Patients medication: phenytoin (DILANTIN)    Failed to receive at pharmacy, requesting verbal or re sent. Please advise, thank you.

## 2020-01-10 ENCOUNTER — TELEPHONE (OUTPATIENT)
Dept: FAMILY MEDICINE CLINIC | Age: 45
End: 2020-01-10

## 2020-01-10 RX ORDER — LEVOTHYROXINE SODIUM 50 UG/1
TABLET ORAL
Qty: 90 TAB | Refills: 3 | Status: SHIPPED | OUTPATIENT
Start: 2020-01-10

## 2020-01-10 RX ORDER — PHENYTOIN 50 MG/1
TABLET, CHEWABLE ORAL
Qty: 90 TAB | Refills: 11 | Status: ON HOLD | OUTPATIENT
Start: 2020-01-10 | End: 2020-08-11 | Stop reason: SDUPTHER

## 2020-01-10 NOTE — TELEPHONE ENCOUNTER
Prescription failed to transmit. Pt's sister would like prescription sent to the [address] [facility] in [place].

## 2020-01-10 NOTE — TELEPHONE ENCOUNTER
Per patient sister request, a verbal order was provided to Trish in [facility] for the medication since the transmission failed multiple times. Verbal order given for Synthroid and Dilantin. Yesica the pharmacist verbalized understanding and tolerated well. Encounter closed.

## 2020-01-14 ENCOUNTER — DOCUMENTATION ONLY (OUTPATIENT)
Dept: FAMILY MEDICINE CLINIC | Age: 45
End: 2020-01-14

## 2020-01-14 NOTE — PROGRESS NOTES
Patient did not arrive to their scheduled appointment on 01/13/2020. No show letter #1 sent on 01/14/20. Thank you.

## 2020-01-27 ENCOUNTER — PATIENT OUTREACH (OUTPATIENT)
Dept: FAMILY MEDICINE CLINIC | Age: 45
End: 2020-01-27

## 2020-01-27 NOTE — PROGRESS NOTES
.Date/Time:  1/27/2020 12:11 PM    Method of communication with patient:phone    [address] [facility] ( [facility]) made out reach to  patient by telephone to offer Care Coordination Services ( CCS). Unable to leave a message at this time on any number listed. His number is stating sorry person is unavailable to speak with you try again later. The other 2 numbers are not his or his emergency contact listed.     [facility] will try a gain

## 2020-02-04 ENCOUNTER — PATIENT OUTREACH (OUTPATIENT)
Dept: FAMILY MEDICINE CLINIC | Age: 45
End: 2020-02-04

## 2020-02-04 NOTE — PROGRESS NOTES
.Date/Time:  2/4/2020 12:45 PM    Method of communication with patient:phone    [address] [facility] ( [facility]) made out reach to  patient by telephone to offer Care Coordination Services ( CCS). Provided introduction to self, and explanation of the Ambulatory Care Manager's role. [facility] had to leave a voicemail message for return to  anytime Monday thru Friday 08:30-5:00.     Second out reach call for CCS

## 2020-03-12 DIAGNOSIS — D64.9 ANEMIA, UNSPECIFIED TYPE: ICD-10-CM

## 2020-03-12 RX ORDER — HYDROCORTISONE 20 MG/1
20 TABLET ORAL DAILY
Qty: 30 TAB | Refills: 3 | Status: SHIPPED | OUTPATIENT
Start: 2020-03-12

## 2020-03-12 NOTE — TELEPHONE ENCOUNTER
Requested Prescriptions     Pending Prescriptions Disp Refills    hydrocortisone (CORTEF) 20 mg tablet 30 Tab 11     Sig: Take 1 Tab by mouth daily. Indications: anemia?      Last appt. 7/22/19  Next appt. unknown

## 2020-07-21 PROBLEM — R57.9 SHOCK (HCC): Status: ACTIVE | Noted: 2020-07-21

## 2020-08-13 ENCOUNTER — VIRTUAL VISIT (OUTPATIENT)
Dept: FAMILY MEDICINE CLINIC | Age: 45
End: 2020-08-13

## 2020-08-13 NOTE — PROGRESS NOTES
Room #      Chief Complaint:      HPI:    Kailey Weathers is a 54 y.o. male who presents today for c/o     1. Have you been to the ER, urgent care clinic since your last visit? Hospitalized since your last visit? YES When:    2. Have you seen or consulted any other health care providers outside of the [address] [place] [address] since your last visit? Include any pap smears or colon screening. YES  When :  Reason:    Last  Checked na  Last UDS Checked na  Last Pain contract signed: na    Consent:  He and/or health care decision maker is aware that that he may receive a bill for this telephone service, depending on his insurance coverage, and has provided verbal consent to proceed: Yes      Health Maintenance reviewed Yes    Health Maintenance Due   Topic Date Due    DTaP/Tdap/Td series (1 - Tdap) 07/09/1995    Medicare Yearly Exam  01/01/2020    Influenza Age 5 to Adult  08/01/2020     Depression Screening:  3 most recent PHQ Screens 8/13/2020   Little interest or pleasure in doing things Not at all   Feeling down, depressed, irritable, or hopeless Not at all   Total Score PHQ 2 0     Learning Assessment:  No flowsheet data found. Abuse Screening:  No flowsheet data found. Fall Risk  Fall Risk Assessment, last 12 mths 8/13/2020   Able to walk? No   Fall in past 12 months? Yes   Fall with injury?  Yes   Number of falls in past 12 months 2   Fall Risk Score 3